# Patient Record
Sex: FEMALE | Race: WHITE | NOT HISPANIC OR LATINO | Employment: UNEMPLOYED | URBAN - NONMETROPOLITAN AREA
[De-identification: names, ages, dates, MRNs, and addresses within clinical notes are randomized per-mention and may not be internally consistent; named-entity substitution may affect disease eponyms.]

---

## 2017-01-16 ENCOUNTER — TRANSCRIBE ORDERS (OUTPATIENT)
Dept: ADMINISTRATIVE | Facility: HOSPITAL | Age: 52
End: 2017-01-16

## 2017-01-16 DIAGNOSIS — Z12.31 VISIT FOR SCREENING MAMMOGRAM: Primary | ICD-10-CM

## 2017-01-25 ENCOUNTER — HOSPITAL ENCOUNTER (OUTPATIENT)
Dept: GENERAL RADIOLOGY | Facility: HOSPITAL | Age: 52
Discharge: HOME | End: 2017-01-25
Admitting: PHYSICIAN ASSISTANT

## 2017-01-25 ENCOUNTER — TRANSCRIBE ORDERS (OUTPATIENT)
Dept: ADMINISTRATIVE | Facility: HOSPITAL | Age: 52
End: 2017-01-25

## 2017-01-25 DIAGNOSIS — Z12.31 VISIT FOR SCREENING MAMMOGRAM: Primary | ICD-10-CM

## 2017-01-25 DIAGNOSIS — M25.561 PAIN IN BOTH KNEES, UNSPECIFIED CHRONICITY: Primary | ICD-10-CM

## 2017-01-25 DIAGNOSIS — M25.562 PAIN IN BOTH KNEES, UNSPECIFIED CHRONICITY: Primary | ICD-10-CM

## 2017-01-25 DIAGNOSIS — M25.561 PAIN IN BOTH KNEES, UNSPECIFIED CHRONICITY: ICD-10-CM

## 2017-01-25 DIAGNOSIS — M25.562 PAIN IN BOTH KNEES, UNSPECIFIED CHRONICITY: ICD-10-CM

## 2017-01-25 PROCEDURE — 73562 X-RAY EXAM OF KNEE 3: CPT | Performed by: RADIOLOGY

## 2017-01-25 PROCEDURE — 73564 X-RAY EXAM KNEE 4 OR MORE: CPT

## 2017-02-11 ENCOUNTER — HOSPITAL ENCOUNTER (OUTPATIENT)
Dept: MAMMOGRAPHY | Facility: HOSPITAL | Age: 52
Discharge: HOME | End: 2017-02-11
Attending: FAMILY MEDICINE | Admitting: FAMILY MEDICINE

## 2017-02-11 ENCOUNTER — APPOINTMENT (OUTPATIENT)
Dept: MAMMOGRAPHY | Facility: HOSPITAL | Age: 52
End: 2017-02-11
Attending: FAMILY MEDICINE

## 2017-02-11 DIAGNOSIS — Z12.31 VISIT FOR SCREENING MAMMOGRAM: ICD-10-CM

## 2017-02-11 PROCEDURE — 77067 SCR MAMMO BI INCL CAD: CPT | Performed by: RADIOLOGY

## 2017-02-11 PROCEDURE — 77063 BREAST TOMOSYNTHESIS BI: CPT | Performed by: RADIOLOGY

## 2017-02-11 PROCEDURE — G0202 SCR MAMMO BI INCL CAD: HCPCS

## 2017-02-11 PROCEDURE — 77063 BREAST TOMOSYNTHESIS BI: CPT

## 2017-09-15 ENCOUNTER — TRANSCRIBE ORDERS (OUTPATIENT)
Dept: LAB | Facility: HOSPITAL | Age: 52
End: 2017-09-15

## 2017-09-15 ENCOUNTER — LAB (OUTPATIENT)
Dept: LAB | Facility: HOSPITAL | Age: 52
End: 2017-09-15

## 2017-09-15 DIAGNOSIS — E13.8 DIABETES MELLITUS OF OTHER TYPE WITH COMPLICATION: ICD-10-CM

## 2017-09-15 DIAGNOSIS — E13.8 DIABETES MELLITUS OF OTHER TYPE WITH COMPLICATION: Primary | ICD-10-CM

## 2017-09-15 LAB
ALBUMIN SERPL-MCNC: 4.3 G/DL (ref 3.2–4.8)
ALBUMIN/GLOB SERPL: 1.6 G/DL (ref 1.5–2.5)
ALP SERPL-CCNC: 80 U/L (ref 25–100)
ALT SERPL W P-5'-P-CCNC: 16 U/L (ref 7–40)
ANION GAP SERPL CALCULATED.3IONS-SCNC: 6 MMOL/L (ref 3–11)
ARTICHOKE IGE QN: 100 MG/DL (ref 0–130)
AST SERPL-CCNC: 21 U/L (ref 0–33)
BILIRUB SERPL-MCNC: 0.6 MG/DL (ref 0.3–1.2)
BUN BLD-MCNC: 22 MG/DL (ref 9–23)
BUN/CREAT SERPL: 31.4 (ref 7–25)
CALCIUM SPEC-SCNC: 9.1 MG/DL (ref 8.7–10.4)
CHLORIDE SERPL-SCNC: 108 MMOL/L (ref 99–109)
CHOLEST SERPL-MCNC: 180 MG/DL (ref 0–200)
CO2 SERPL-SCNC: 28 MMOL/L (ref 20–31)
CREAT BLD-MCNC: 0.7 MG/DL (ref 0.6–1.3)
DEPRECATED RDW RBC AUTO: 46 FL (ref 37–54)
ERYTHROCYTE [DISTWIDTH] IN BLOOD BY AUTOMATED COUNT: 13.7 % (ref 11.3–14.5)
GFR SERPL CREATININE-BSD FRML MDRD: 87 ML/MIN/1.73
GLOBULIN UR ELPH-MCNC: 2.7 GM/DL
GLUCOSE BLD-MCNC: 129 MG/DL (ref 70–100)
HBA1C MFR BLD: 6.2 % (ref 4.8–5.6)
HCT VFR BLD AUTO: 40.3 % (ref 34.5–44)
HDLC SERPL-MCNC: 54 MG/DL (ref 40–60)
HGB BLD-MCNC: 12.8 G/DL (ref 11.5–15.5)
MCH RBC QN AUTO: 29.2 PG (ref 27–31)
MCHC RBC AUTO-ENTMCNC: 31.8 G/DL (ref 32–36)
MCV RBC AUTO: 91.8 FL (ref 80–99)
PLATELET # BLD AUTO: 279 10*3/MM3 (ref 150–450)
PMV BLD AUTO: 8.9 FL (ref 6–12)
POTASSIUM BLD-SCNC: 4.8 MMOL/L (ref 3.5–5.5)
PROT SERPL-MCNC: 7 G/DL (ref 5.7–8.2)
RBC # BLD AUTO: 4.39 10*6/MM3 (ref 3.89–5.14)
SODIUM BLD-SCNC: 142 MMOL/L (ref 132–146)
TRIGL SERPL-MCNC: 104 MG/DL (ref 0–150)
WBC NRBC COR # BLD: 6.08 10*3/MM3 (ref 3.5–10.8)

## 2017-09-15 PROCEDURE — 80053 COMPREHEN METABOLIC PANEL: CPT

## 2017-09-15 PROCEDURE — 80061 LIPID PANEL: CPT

## 2017-09-15 PROCEDURE — 83036 HEMOGLOBIN GLYCOSYLATED A1C: CPT

## 2017-09-15 PROCEDURE — 85027 COMPLETE CBC AUTOMATED: CPT

## 2017-09-15 PROCEDURE — 36415 COLL VENOUS BLD VENIPUNCTURE: CPT

## 2017-09-15 PROCEDURE — 82043 UR ALBUMIN QUANTITATIVE: CPT

## 2017-09-15 PROCEDURE — 82570 ASSAY OF URINE CREATININE: CPT

## 2017-09-16 LAB
CREAT 24H UR-MCNC: 79.9 MG/DL
MICROALB/CRT. RATIO UR: 9.4 MG/G CREAT (ref 0–30)
MICROALBUMIN UR-MCNC: 7.5 UG/ML

## 2018-04-23 ENCOUNTER — TRANSCRIBE ORDERS (OUTPATIENT)
Dept: LAB | Facility: HOSPITAL | Age: 53
End: 2018-04-23

## 2018-04-23 ENCOUNTER — LAB (OUTPATIENT)
Dept: LAB | Facility: HOSPITAL | Age: 53
End: 2018-04-23

## 2018-04-23 DIAGNOSIS — I10 ESSENTIAL HYPERTENSION, MALIGNANT: ICD-10-CM

## 2018-04-23 DIAGNOSIS — E78.2 MIXED HYPERLIPIDEMIA: Primary | ICD-10-CM

## 2018-04-23 DIAGNOSIS — E10.9 TYPE 1 DIABETES MELLITUS WITHOUT COMPLICATION (HCC): ICD-10-CM

## 2018-04-23 DIAGNOSIS — E78.2 MIXED HYPERLIPIDEMIA: ICD-10-CM

## 2018-04-23 LAB
ALBUMIN SERPL-MCNC: 4.4 G/DL (ref 3.2–4.8)
ALBUMIN/GLOB SERPL: 1.8 G/DL (ref 1.5–2.5)
ALP SERPL-CCNC: 73 U/L (ref 25–100)
ALT SERPL W P-5'-P-CCNC: 25 U/L (ref 7–40)
ANION GAP SERPL CALCULATED.3IONS-SCNC: 7 MMOL/L (ref 3–11)
ARTICHOKE IGE QN: 110 MG/DL (ref 0–130)
AST SERPL-CCNC: 22 U/L (ref 0–33)
BILIRUB SERPL-MCNC: 0.9 MG/DL (ref 0.3–1.2)
BUN BLD-MCNC: 25 MG/DL (ref 9–23)
BUN/CREAT SERPL: 31.3 (ref 7–25)
CALCIUM SPEC-SCNC: 9.4 MG/DL (ref 8.7–10.4)
CHLORIDE SERPL-SCNC: 104 MMOL/L (ref 99–109)
CHOLEST SERPL-MCNC: 190 MG/DL (ref 0–200)
CO2 SERPL-SCNC: 28 MMOL/L (ref 20–31)
CREAT BLD-MCNC: 0.8 MG/DL (ref 0.6–1.3)
DEPRECATED RDW RBC AUTO: 44.7 FL (ref 37–54)
ERYTHROCYTE [DISTWIDTH] IN BLOOD BY AUTOMATED COUNT: 13.7 % (ref 11.3–14.5)
GFR SERPL CREATININE-BSD FRML MDRD: 75 ML/MIN/1.73
GLOBULIN UR ELPH-MCNC: 2.5 GM/DL
GLUCOSE BLD-MCNC: 145 MG/DL (ref 70–100)
HBA1C MFR BLD: 6.7 % (ref 4.8–5.6)
HCT VFR BLD AUTO: 41 % (ref 34.5–44)
HDLC SERPL-MCNC: 68 MG/DL (ref 40–60)
HGB BLD-MCNC: 13.3 G/DL (ref 11.5–15.5)
MCH RBC QN AUTO: 29 PG (ref 27–31)
MCHC RBC AUTO-ENTMCNC: 32.4 G/DL (ref 32–36)
MCV RBC AUTO: 89.5 FL (ref 80–99)
PLATELET # BLD AUTO: 303 10*3/MM3 (ref 150–450)
PMV BLD AUTO: 8.9 FL (ref 6–12)
POTASSIUM BLD-SCNC: 4.3 MMOL/L (ref 3.5–5.5)
PROT SERPL-MCNC: 6.9 G/DL (ref 5.7–8.2)
RBC # BLD AUTO: 4.58 10*6/MM3 (ref 3.89–5.14)
SODIUM BLD-SCNC: 139 MMOL/L (ref 132–146)
TRIGL SERPL-MCNC: 74 MG/DL (ref 0–150)
WBC NRBC COR # BLD: 7.39 10*3/MM3 (ref 3.5–10.8)

## 2018-04-23 PROCEDURE — 83036 HEMOGLOBIN GLYCOSYLATED A1C: CPT | Performed by: PHYSICIAN ASSISTANT

## 2018-04-23 PROCEDURE — 80061 LIPID PANEL: CPT

## 2018-04-23 PROCEDURE — 36415 COLL VENOUS BLD VENIPUNCTURE: CPT | Performed by: PHYSICIAN ASSISTANT

## 2018-04-23 PROCEDURE — 80053 COMPREHEN METABOLIC PANEL: CPT

## 2018-04-23 PROCEDURE — 85027 COMPLETE CBC AUTOMATED: CPT

## 2018-04-25 DIAGNOSIS — M25.561 PAIN IN BOTH KNEES, UNSPECIFIED CHRONICITY: Primary | ICD-10-CM

## 2018-04-25 DIAGNOSIS — M25.562 PAIN IN BOTH KNEES, UNSPECIFIED CHRONICITY: Primary | ICD-10-CM

## 2018-04-26 ENCOUNTER — OFFICE VISIT (OUTPATIENT)
Dept: ORTHOPEDIC SURGERY | Facility: CLINIC | Age: 53
End: 2018-04-26

## 2018-04-26 ENCOUNTER — HOSPITAL ENCOUNTER (OUTPATIENT)
Dept: GENERAL RADIOLOGY | Facility: HOSPITAL | Age: 53
Discharge: HOME | End: 2018-04-26
Attending: ORTHOPAEDIC SURGERY | Admitting: ORTHOPAEDIC SURGERY

## 2018-04-26 VITALS — HEIGHT: 72 IN | BODY MASS INDEX: 33.72 KG/M2 | WEIGHT: 249 LBS

## 2018-04-26 DIAGNOSIS — M25.562 PAIN IN BOTH KNEES, UNSPECIFIED CHRONICITY: ICD-10-CM

## 2018-04-26 DIAGNOSIS — M25.561 PAIN IN BOTH KNEES, UNSPECIFIED CHRONICITY: ICD-10-CM

## 2018-04-26 DIAGNOSIS — M22.2X1 PATELLOFEMORAL PAIN SYNDROME OF BOTH KNEES: Primary | ICD-10-CM

## 2018-04-26 DIAGNOSIS — M22.2X2 PATELLOFEMORAL PAIN SYNDROME OF BOTH KNEES: Primary | ICD-10-CM

## 2018-04-26 PROCEDURE — 99203 OFFICE O/P NEW LOW 30 MIN: CPT | Performed by: ORTHOPAEDIC SURGERY

## 2018-04-26 PROCEDURE — 73564 X-RAY EXAM KNEE 4 OR MORE: CPT

## 2018-04-26 PROCEDURE — 73562 X-RAY EXAM OF KNEE 3: CPT | Performed by: RADIOLOGY

## 2018-04-26 NOTE — PROGRESS NOTES
New Patient Visit        Patient: Zina Angel  YOB: 1965  Date of encounter: 4/26/2018      History of Present Illness:   Zina Angel is a 52 y.o. female who is referred here today by Jj Padilla PA-C for evaluation of bilateral knee pain.  She states she's had ongoing knee pain for several years with no known injury.  She states the right is a little worse than the left.  She complains of anterior knee pain with a popping and cracking sensation.  She states her pain is worse with weightbearing activities and especially worse with going up and down stairs or flexion of the knee.  She is currently taking Mobic with mild improvement in her symptoms.  She is also received intra-articular steroid injections would seem to ease the pain for about 2-1/2-3 months at a time.  She states her last injection was given about 2 weeks ago.  She denies swelling, catching, or locking sensation.    PMH:   There is no problem list on file for this patient.    History reviewed. No pertinent past medical history.    PSH:  Past Surgical History:   Procedure Laterality Date   • HYSTERECTOMY      partial 43       Allergies:     Allergies   Allergen Reactions   • Penicillins    • Sulfa Antibiotics        Medications:     Current Outpatient Prescriptions:   •  atorvastatin (LIPITOR) 10 MG tablet, Take 1 tablet by mouth daily., Disp: 30 tablet, Rfl: 2  •  atorvastatin (LIPITOR) 10 MG tablet, Take 1 tablet by mouth Daily., Disp: 30 tablet, Rfl: 2  •  atorvastatin (LIPITOR) 10 MG tablet, Take 1 tablet by mouth Daily., Disp: 90 tablet, Rfl: 1  •  azithromycin (ZITHROMAX Z-CEDRICK) 250 MG tablet, Take 2 tablets by mouth on the first day, then 1 tablet every day, Disp: 6 tablet, Rfl: 0  •  benzonatate (TESSALON) 200 MG capsule, Take 1 capsule by mouth Every 8 Hours As Needed., Disp: 30 capsule, Rfl: 0  •  benzonatate (TESSALON) 200 MG capsule, Take 1 capsule by mouth Every 8 (Eight) Hours As Needed., Disp: 30 capsule, Rfl:  0  •  cephalexin (KEFLEX) 500 MG capsule, Take 1 capsule by mouth 2 (Two) Times a Day., Disp: 60 capsule, Rfl: 2  •  clindamycin (CLEOCIN T) 1 % lotion, Apply to affected area 2 times a day, Disp: 60 mL, Rfl: 6  •  diclofenac (VOLTAREN) 1 % gel gel, Apply topically twice daily., Disp: 300 g, Rfl: 0  •  loratadine (CLARITIN) 10 MG tablet, Take 1 tablet by mouth Daily., Disp: 30 tablet, Rfl: 0  •  meclizine (ANTIVERT) 25 MG tablet, Take 1 tablet by mouth Every 8 (Eight) Hours As Needed., Disp: 30 tablet, Rfl: 0  •  meloxicam (MOBIC) 7.5 MG tablet, Take 1 tablet by mouth 2 (Two) Times a Day., Disp: 180 tablet, Rfl: 3  •  meloxicam (MOBIC) 7.5 MG tablet, Take 1 tablet by mouth 2 (Two) Times a Day., Disp: 180 tablet, Rfl: 1  •  metFORMIN (GLUCOPHAGE) 500 MG tablet, Take 1 tablet by mouth 2 (Two) Times a Day., Disp: 60 tablet, Rfl: 0  •  metFORMIN (GLUCOPHAGE) 500 MG tablet, Take 1 tablet by mouth 2 (Two) Times a Day., Disp: 60 tablet, Rfl: 1  •  metFORMIN (GLUCOPHAGE) 500 MG tablet, Take 1 tablet by mouth 2 (Two) Times a Day., Disp: 180 tablet, Rfl: 1  •  nabumetone (RELAFEN) 500 MG tablet, Take 1 tablet by mouth 2 (Two) Times a Day., Disp: 60 tablet, Rfl: 2  •  nabumetone (RELAFEN) 500 MG tablet, Take 1 tablet by mouth 2 (Two) Times a Day., Disp: 60 tablet, Rfl: 2  •  pantoprazole (PROTONIX) 40 MG EC tablet, Take 1 tablet by mouth Daily., Disp: 90 tablet, Rfl: 3  •  pantoprazole (PROTONIX) 40 MG EC tablet, Take 1 tablet by mouth Daily., Disp: 30 tablet, Rfl: 0  •  pantoprazole (PROTONIX) 40 MG EC tablet, Take 1 tablet by mouth Daily., Disp: 30 tablet, Rfl: 0  •  pantoprazole (PROTONIX) 40 MG EC tablet, Take 1 tablet by mouth Daily., Disp: 30 tablet, Rfl: 1  •  pantoprazole (PROTONIX) 40 MG EC tablet, Take 1 tablet by mouth Daily., Disp: 30 tablet, Rfl: 1  •  pantoprazole (PROTONIX) 40 MG EC tablet, Take 1 tablet by mouth Daily., Disp: 90 tablet, Rfl: 1  •  pantoprazole (PROTONIX) 40 MG EC tablet, Take 1 tablet by mouth  "Daily., Disp: 90 tablet, Rfl: 1  •  tiZANidine (ZANAFLEX) 4 MG tablet, Take 1 tablet by mouth daily., Disp: 90 tablet, Rfl: 3  •  tiZANidine (ZANAFLEX) 4 MG tablet, Take 1 tablet by mouth Daily., Disp: 90 tablet, Rfl: 3    Social History:  Social History     Social History   • Marital status: Single     Spouse name: N/A   • Number of children: N/A   • Years of education: N/A     Occupational History   • Not on file.     Social History Main Topics   • Smoking status: Not on file   • Smokeless tobacco: Not on file   • Alcohol use Not on file   • Drug use: Unknown   • Sexual activity: Not on file     Other Topics Concern   • Not on file     Social History Narrative   • No narrative on file       Family History:   History reviewed. No pertinent family history.    Review of Systems:   Review of Systems   Constitutional: Positive for fatigue.   HENT: Positive for sinus pain, sinus pressure and tinnitus.    Eyes: Positive for pain, discharge, redness and itching.   Respiratory: Negative.    Cardiovascular: Negative.    Gastrointestinal: Negative.    Endocrine: Negative.    Genitourinary: Negative.    Musculoskeletal: Positive for arthralgias, joint swelling and myalgias.   Skin: Negative.    Neurological: Negative.    Hematological: Negative.    Psychiatric/Behavioral: The patient is nervous/anxious.        Physical Exam: 52 y.o. female  General Appearance:    Alert and oriented x 3, cooperative, in no acute distress                   Vitals:    04/26/18 1438   Weight: 113 kg (249 lb)   Height: 185.4 cm (73\")              Body mass index is 32.85 kg/m².        Musculoskeletal: Examination of the bilateral knees reveals no effusion.  She has mild medial joint line tenderness.  She has moderate crepitus of the patella with full range of motion.  No instability with varus or valgus stressing.  Her neurovascular status is intact.    Radiology:     3 views of the bilateral knees were reviewed revealing narrowing of the " patellofemoral joint with lateral tilt.  There is minimal narrowing of the medial compartment.    Assessment    ICD-10-CM ICD-9-CM   1. Patellofemoral pain syndrome of both knees M22.2X1 719.46    M22.2X2        Plan:   A 52-year-old female with complete bilateral knee pain.  X-rays were reviewed revealing mild arthritis mainly within the patellofemoral joint.  She does have slight lateral tilt on x-rays as well.  We would like to get her started in formal physical therapy and an anterior knee program especially working on strengthening of the quadriceps muscle.  In addition of this we provided her with a lateral J brace for the right knee to see if this whole help reduce some of her pain.  We'll have her return back in 6 weeks for reevaluation.    Written by, Soni RINCON, acting as a scribe for Dr. Herzog              Patient's Body mass index is 32.85 kg/m². BMI is above normal parameters. Follow-up plan includes:  educational material.

## 2018-06-25 DIAGNOSIS — M22.2X2 PATELLOFEMORAL PAIN SYNDROME OF BOTH KNEES: Primary | ICD-10-CM

## 2018-06-25 DIAGNOSIS — M22.2X1 PATELLOFEMORAL PAIN SYNDROME OF BOTH KNEES: Primary | ICD-10-CM

## 2018-07-12 ENCOUNTER — HOSPITAL ENCOUNTER (OUTPATIENT)
Dept: PHYSICAL THERAPY | Facility: HOSPITAL | Age: 53
Setting detail: THERAPIES SERIES
Discharge: HOME | End: 2018-07-12

## 2018-07-12 DIAGNOSIS — M22.2X2 PATELLOFEMORAL PAIN SYNDROME OF BOTH KNEES: Primary | ICD-10-CM

## 2018-07-12 DIAGNOSIS — M22.2X1 PATELLOFEMORAL PAIN SYNDROME OF BOTH KNEES: Primary | ICD-10-CM

## 2018-07-12 PROCEDURE — 97163 PT EVAL HIGH COMPLEX 45 MIN: CPT | Performed by: PHYSICAL THERAPIST

## 2018-07-12 PROCEDURE — G0283 ELEC STIM OTHER THAN WOUND: HCPCS | Performed by: PHYSICAL THERAPIST

## 2018-07-12 PROCEDURE — 97010 HOT OR COLD PACKS THERAPY: CPT | Performed by: PHYSICAL THERAPIST

## 2018-07-12 NOTE — THERAPY EVALUATION
"    Outpatient Physical Therapy Ortho Initial Evaluation   [facility]     Patient Name: Zina Angel  : 1965  MRN: [id number]  Today's Date: 2018      Visit Date: 2018    There is no problem list on file for this patient.       Past Medical History:   Diagnosis Date   • Arthritis    • Diabetes mellitus (CMS/[facility]ly [facility])         Past Surgical History:   Procedure Laterality Date   • HYSTERECTOMY      partial 43       Visit Dx:     ICD-10-CM ICD-9-CM   1. Patellofemoral pain syndrome of both knees M22.2X1 719.46    M22.2X2              Patient History     Row Name 18 1500             History    Chief Complaint Pain  -AD      Type of Pain Knee pain  -AD      Date Current Problem(s) Began --   Ongoing  -AD      Brief Description of Current Complaint Pt reports a slow progression of bilateral knee pain. She stated both knees stay swollen, have \"knots\", and \"cringe when I walk\". She stated she is unable to ascend/descend stairs or squat secondary to pain. She stated she has had injections for approximately one year and has recently been referred to an orthopedic. She stated prolonged standing and walking increase pain, which is a part of her job description. She staetd her knees tend to \"lock up and give out\" with occasional \"catching\". She stated the catch will last several hours at a time. She stated she has been provided a right knee brace to keep her \"knee cap in place\".  -AD      Patient/Caregiver Goals Relieve pain;Return to prior level of function;Improve mobility;Improve strength  -AD      Current Tobacco Use None  -AD      Patient's Rating of General Health Very good  -AD      Occupation/sports/leisure activities   -AD      What clinical tests have you had for this problem? X-ray  -AD      Results of Clinical Tests Negative for fractures.  -AD      Are you or can you be pregnant No  -AD         Pain     Pain Location Knee   Bilateral  -AD      Pain at Present 4  -AD      Pain " "at Best 4  -AD      Pain at Worst 8  -AD      Pain Frequency Constant/continuous  -AD      Pain Description Aching  -AD      What Performance Factors Make the Current Problem(s) WORSE? Prolonged standing, walking, squatting, stairs  -AD      What Performance Factors Make the Current Problem(s) BETTER? Rest, ice, biofreeze  -AD      Tolerance Time- Standing 2 hours  -AD      Tolerance Time- Sitting 2 hours  -AD      Tolerance Time- Walking Intermittent for a couple hours  -AD      Is your sleep disturbed? No  -AD      What position do you sleep in? Supine  -AD      Difficulties at work? Pain with prolonged standing and walking.  -AD      Difficulties with ADL's? None  -AD         Fall Risk Assessment    Any falls in the past year: No  -AD         Daily Activities    Primary Language English  -AD      Are you able to read Yes  -AD      Are you able to write Yes  -AD      How does patient learn best? Listening  -AD      Pt Participated in POC and Goals Yes  -AD         Safety    Are you being hurt, hit, or frightened by anyone at home or in your life? No  -AD      Are you being neglected by a caregiver No  -AD        User Key  (r) = Recorded By, (t) = Taken By, (c) = Cosigned By    Initials Name Provider Type    AD Ashley Claudene Dalton, PT Physical Therapist                PT Ortho     Row Name 07/12/18 1500       Posture/Observations    Posture- WNL Posture is WNL  -AD       Special Tests/Palpation    Special Tests/Palpation --   Right medial joint line, bilateral patella, deep\"whole knee\"  -AD       Knee Special Tests    Anterior drawer (ACL lesion) Bilateral:;Negative  -AD    Posterior drawer (PCL lesion) Bilateral:;Negative  -AD    Valgus stress (MCL lesion) Bilateral:;Negative  -AD    Varus stress (LCL lesion) Bilateral:;Negative  -AD    Ian’s test (meniscal lesion) Bilateral:;Negative  -AD    Zackery’s sign (DVT) Bilateral:;Negative  -AD       Right Lower Ext    Rt Knee Extension/Flexion AROM 0-130  -AD    "    Left Lower Ext    Lt Knee Extension/Flexion AROM 0-130  -AD       MMT (Manual Muscle Testing)    Additional Documentation --   RLE: 4/5, LLE: 4+/5  -AD       Sensation    Sensation WNL? WNL  -AD       Flexibility    Flexibility Tested? --   Hamstring: Rt 30 deg from neutral, left 35 deg from neutral  -AD       RLE Quick Girth (cm)    Mid patella 49 cm  -AD       LLE Quick Girth (cm)    Mid patella 47 cm  -AD       Transfers    Bed-[place] (Transfers) independent  -AD    Chair-[place] (Transfers) independent  -AD    Sit-Stand [place] (Transfers) independent  -AD    Stand-Sit Hill (Transfers) independent  -AD       Gait/Stairs Assessment/Training    Comment (Gait/Stairs) Decreased stance phase, decreased step length, decreased weight shifting  -AD      User Key  (r) = Recorded By, (t) = Taken By, (c) = Cosigned By    Initials Name Provider Type    AD Ashley Claudene Dalton, PT Physical Therapist                      Therapy Education  Given: HEP, Symptoms/condition management, Pain management, Posture/body mechanics, Mobility training  Program: New  How Provided: Verbal, Demonstration  Provided to: Patient, Caregiver  Level of Understanding: Teach back education performed, Verbalized, Demonstrated           PT OP Goals     Row Name 07/12/18 1600          PT Short Term Goals    STG Date to Achieve 07/26/18  -AD     STG 1 Pt will be instructed in HEP for improved independence.  -AD     STG 1 Progress New  -AD     STG 2 Pt will report the ability to ascend/descend five stairs with no more than 6/10 bilateral knee pain for improved function.  -AD     STG 2 Progress New  -AD     STG 3 Pt will report the ability to lift a light object from the floor with no more than 5/10 bilateral knee pain.  -AD     STG 3 Progress New  -AD        Long Term Goals    LTG Date to Achieve 08/11/18  -AD     LTG 1 Pt will report the ability to get into and out of her car with bilateral knee pain.  -AD     LTG  "1 Progress New  -AD     LTG 2 Pt will demonstrate 5/5 bilateral lower extremity strength for improved function.  -AD     LTG 2 Progress New  -AD     LTG 3 Pt will report at least 60/80 on the LEFS for improved functional mobility.  -AD     LTG 3 Progress New  -AD     LTG 4 Pt will report the ability to ascend/descend one flight of stairs with no more than 4/10 bilateral knee pain for improved community mobility.  -AD     LTG 4 Progress New  -AD     LTG 5 Pt will report the ability to work a full shift with no more than 4/10 bilateral knee pain.  -AD     LTG 5 Progress New  -AD        Time Calculation    PT Goal Re-Cert Due Date 08/11/18  -AD       User Key  (r) = Recorded By, (t) = Taken By, (c) = Cosigned By    Initials Name Provider Type    AD Ashley Claudene Dalton, PT Physical Therapist                PT Assessment/Plan     Row Name 07/12/18 1627          PT Assessment    Functional Limitations Decreased safety during functional activities;Impaired gait;Limitations in functional capacity and performance;Performance in leisure activities;Performance in self-care ADL;Performance in work activities;Limitations in community activities;Limitation in home management  -AD     Impairments Balance;Cognition;Gait;Range of motion;Muscle strength;Poor body mechanics;Posture;Joint mobility;Joint integrity;Pain  -AD     Assessment Comments The patient is a 53 year old female presenting to the clinic with bilateral knee pain. She presented with tenderness of bilateral patellas, right medial joint line pain, and a \"deep ache throughout the whole knee\", bilaterally. Special tests for ligamentous involvement were negative, with quadriceps weakness observed and valgus movement observed during mini squats. The patient would benefit from skilled physical therapy to address functional limitations and impairments which are affecting daily life at home, community, and work. Today's evaluation was concluded with moist heat combined with " premod to bialteral knees, with no skin irritation observed. She will be progressed as tolerated.  -AD     Please refer to paper survey for additional self-reported information Yes  -AD     Rehab Potential Good  -AD     Patient/caregiver participated in establishment of treatment plan and goals Yes  -AD     Patient would benefit from skilled therapy intervention Yes  -AD        PT Plan    PT Frequency 2x/week;1x/week  -AD     Predicted Duration of Therapy Intervention (Therapy Eval) 4 weeks  -AD     Planned CPT's? PT EVAL HIGH COMPLEXITY: 50626;PT RE-EVAL: 06375;PT NEUROMUSC RE-EDUCATION EA 15 MIN: 15284;PT MANUAL THERAPY EA 15 MIN: 97070;PT THER ACT EA 15 MIN: 50894;PT THER PROC EA 15 MIN: 35628;PT GAIT TRAINING EA 15 MIN: 33627;PT SELF CARE/HOME MGMT/TRAIN EA 15: 96535;PT ULTRASOUND EA 15 MIN: 54229;PT ELECTRICAL STIM ATTD EA 15 MIN: 47702;PT ELECTRICAL STIM UNATTEND: ;PT HOT/COLD PACK WC NONMCARE: 42930;PT IONTOPHORESIS EA 15 MIN: 78749;PT THER SUPP EA 15 MIN  -AD     PT Plan Comments Progress as tolerated per POC.  -AD       User Key  (r) = Recorded By, (t) = Taken By, (c) = Cosigned By    Initials Name Provider Type    AD Ashley Claudene Dalton, PT Physical Therapist                Modalities     Row Name 07/12/18 1500             Moist Heat    MH Applied Yes  -AD      Location Bilateral knees  -AD      Rx Minutes 15 mins  -AD      MH S/P Rx Yes   With premod, no skin irritation observed.  -AD         ELECTRICAL STIMULATION    Attended/Unattended Unattended  -AD      Stimulation Type Pre-Mod  -AD      Max mAmp --   Per pt tolerance  -AD      80359 - PT Electrical Stimulation (Manual) Minutes --   Unattended with MH for 15 minutes  -AD        User Key  (r) = Recorded By, (t) = Taken By, (c) = Cosigned By    Initials Name Provider Type    AD Ashley Claudene Dalton, PT Physical Therapist                          Outcome Measure Options: Lower Extremity Functional Scale (LEFS)  Lower Extremity Functional  Index  Any of your usual work, housework or school activities: A little bit of difficulty  Your usual hobbies, recreational or sporting activities: A little bit of difficulty  Getting into or out of the bath: A little bit of difficulty  Walking between rooms: A little bit of difficulty  Putting on your shoes or socks: No difficulty  Squatting: Extreme difficulty or unable to perform activity  Lifting an object, like a bag of groceries from the floor: A little bit of difficulty  Performing light activities around your home: A little bit of difficulty  Performing heavy activities around your home: A little bit of difficulty  Getting into or out of a car: Moderate difficulty  Walking 2 blocks: Moderate difficulty  Walking a mile: Moderate difficulty  Going up or down 10 stairs (about 1 flight of stairs): Extreme difficulty or unable to perform activity  Standing for 1 hour: Moderate difficulty  Sitting for 1 hour: Moderate difficulty  Running on even ground: Moderate difficulty  Running on uneven ground: Moderate difficulty  Making sharp turns while running fast: A little bit of difficulty  Hopping: A little bit of difficulty  Rolling over in bed: No difficulty  Total: 49      Time Calculation:     Therapy Suggested Charges     Code   Minutes Charges    None             Start Time: 1500  Stop Time: 1620  Time Calculation (min): 80 min     Therapy Charges for Today     Code Description Service Date Service Provider Modifiers Qty    98495699717 HC PT EVAL HIGH COMPLEXITY 4 7/12/2018 Ashley Claudene Dalton, PT GP 1    24236118494 HC PT ELECTRICAL STIM UNATTENDED 7/12/2018 Ashley Claudene Dalton, PT  1    60851759636 HC PT HOT/COLD PACK WC NONMCARE 7/12/2018 Ashley Claudene Dalton, PT GP 1          PT G-Codes  Outcome Measure Options: Lower Extremity Functional Scale (LEFS)         Ashley Claudene Dalton, PT  7/12/2018

## 2018-07-19 ENCOUNTER — HOSPITAL ENCOUNTER (OUTPATIENT)
Dept: PHYSICAL THERAPY | Facility: HOSPITAL | Age: 53
Setting detail: THERAPIES SERIES
Discharge: HOME | End: 2018-07-19

## 2018-07-19 DIAGNOSIS — M22.2X1 PATELLOFEMORAL PAIN SYNDROME OF BOTH KNEES: Primary | ICD-10-CM

## 2018-07-19 DIAGNOSIS — M22.2X2 PATELLOFEMORAL PAIN SYNDROME OF BOTH KNEES: Primary | ICD-10-CM

## 2018-07-19 PROCEDURE — 97010 HOT OR COLD PACKS THERAPY: CPT | Performed by: PHYSICAL THERAPIST

## 2018-07-19 PROCEDURE — 97110 THERAPEUTIC EXERCISES: CPT | Performed by: PHYSICAL THERAPIST

## 2018-07-19 PROCEDURE — G0283 ELEC STIM OTHER THAN WOUND: HCPCS | Performed by: PHYSICAL THERAPIST

## 2018-07-19 PROCEDURE — 97035 APP MDLTY 1+ULTRASOUND EA 15: CPT | Performed by: PHYSICAL THERAPIST

## 2018-07-19 NOTE — THERAPY TREATMENT NOTE
Outpatient Physical Therapy Ortho Treatment Note  [facility]     Patient Name: Zina Angel  : 1965  MRN: [id number]  Today's Date: 2018      Visit Date: 2018    Visit Dx:    ICD-10-CM ICD-9-CM   1. Patellofemoral pain syndrome of both knees M22.2X1 719.46    M22.2X2        There is no problem list on file for this patient.       Past Medical History:   Diagnosis Date   • Arthritis    • Diabetes mellitus (CMS/HCC)         Past Surgical History:   Procedure Laterality Date   • HYSTERECTOMY      partial 43             PT Ortho     Row Name 18 1600       Subjective Comments    Subjective Comments Patient reported bilateral knee pain prior to today's treatment session. She stated she has more pain and difficulty with exercises on the left as compared to the right.  -AD       Subjective Pain    Able to rate subjective pain? yes  -AD    Pre-Treatment Pain Level 6  -AD    Post-Treatment Pain Level 4  -AD      User Key  (r) = Recorded By, (t) = Taken By, (c) = Cosigned By    Initials Name Provider Type    AD Ashley Claudene Dalton, PT Physical Therapist                            PT Assessment/Plan     Row Name 18 1716          PT Assessment    Assessment Comments Today's session was initiated with moist heat combined with premod e-stim to bilateral knees in the supine position. Therapeutic exercises were then performed to address bilateral lower extremity strength and range of motion. The patient demonstrated increased difficulty while performing exercises with the left lower extremity as compared to the left. The patient tolerated therapeutic exercises well, with fatigue but no increase in pain reported. The session concluded with therapeutic ultrasound with biofreeze to bilateral knees. No skin irritation was observed following modalities. She will be progressed as tolerated.  -AD        PT Plan    PT Plan Comments Progress as tolerated per POC. Patient requested to attend physical  therapy one time per week secondary to co-pay.  -AD       User Key  (r) = Recorded By, (t) = Taken By, (c) = Cosigned By    Initials Name Provider Type    AD Ashley Claudene Dalton, PT Physical Therapist                Modalities     Row Name 07/19/18 1600             Moist Heat    MH Applied Yes  -AD      Location Bilateral knees  -AD      Rx Minutes 10 mins  -AD      MH Prior to Rx Yes   With premod, no skin irritation observed.  -AD         Ultrasound 82181    Location Bilateral knees   With biofreeze  -AD      Duty Cycle 50  -AD      Frequency --   3.3 MHz  -AD      Intensity - Wts/cm 1.2  -AD      22160 - PT Ultrasound Minutes 8   No skin irritation observed.  -AD         ELECTRICAL STIMULATION    Attended/Unattended Unattended  -AD      Stimulation Type Pre-Mod  -AD      Max mAmp --   Per pt tolerance  -AD      Location/Electrode Placement/Other Bilateral knees  -AD      38845 - PT Electrical Stimulation (Manual) Minutes --   Unattended 10 minutes with moist heat.  -AD        User Key  (r) = Recorded By, (t) = Taken By, (c) = Cosigned By    Initials Name Provider Type    AD Ashley Claudene Dalton, PT Physical Therapist                Exercises     Row Name 07/19/18 1600             Subjective Comments    Subjective Comments Patient reported bilateral knee pain prior to today's treatment session. She stated she has more pain and difficulty with exercises on the left as compared to the right.  -AD         Subjective Pain    Able to rate subjective pain? yes  -AD      Pre-Treatment Pain Level 6  -AD      Post-Treatment Pain Level 4  -AD         Total Minutes    94828 - PT Therapeutic Exercise Minutes 35  -AD         Exercise 1    Exercise Name 1 Heel slides, QS, GS, SLR, SAQ, sidelying hip abduction, LE bicycle level 1 for 7 minutes, knee flexion stretch with towel.  -AD      Cueing 1 Verbal;Tactile;Demo  -AD      Sets 1 2  -AD      Reps 1 15  -AD      Time 1 35 minutes  -AD        User Key  (r) = Recorded By,  (t) = Taken By, (c) = Cosigned By    Initials Name Provider Type    AD Ashley Claudene Dalton, PT Physical Therapist                             Therapy Education  Given: HEP, Symptoms/condition management, Pain management, Posture/body mechanics, Mobility training  Program: Reinforced  How Provided: Verbal, Demonstration  Provided to: Patient, Caregiver  Level of Understanding: Teach back education performed, Verbalized, Demonstrated              Time Calculation:   Start Time: 1600  Stop Time: 1715  Time Calculation (min): 75 min  Therapy Suggested Charges     Code   Minutes Charges    71590 (CPT®) Hc Pt Neuromusc Re Education Ea 15 Min      53100 (CPT®) Hc Pt Ther Proc Ea 15 Min 35 2    97162 (CPT®) Hc Gait Training Ea 15 Min      74517 (CPT®) Hc Pt Therapeutic Act Ea 15 Min      62037 (CPT®) Hc Pt Manual Therapy Ea 15 Min      52116 (CPT®) Hc Pt Ther Massage- Per 15 Min      97717 (CPT®) Hc Pt Iontophoresis Ea 15 Min      30898 (CPT®) Hc Pt Elec Stim Ea-Per 15 Min      64834 (CPT®) Hc Pt Ultrasound Ea 15 Min 8 1    20738 (CPT®) Hc Pt Self Care/Mgmt/Train Ea 15 Min      Total  43 3        Therapy Charges for Today     Code Description Service Date Service Provider Modifiers Qty    97320875033 HC PT THER PROC EA 15 MIN 7/19/2018 Ashley Claudene Dalton, PT GP 2    82242066089 HC PT ULTRASOUND EA 15 MIN 7/19/2018 Ashley Claudene Dalton, PT GP 1    03434469482 HC PT ELECTRICAL STIM UNATTENDED 7/19/2018 Ashley Claudene Dalton, PT  1    71627853986 HC PT HOT/COLD PACK WC NONMCARE 7/19/2018 Ashley Claudene Dalton, PT GP 1                    Ashley Claudene Dalton, PT  7/19/2018

## 2018-07-26 ENCOUNTER — APPOINTMENT (OUTPATIENT)
Dept: PHYSICAL THERAPY | Facility: HOSPITAL | Age: 53
End: 2018-07-26

## 2018-08-02 ENCOUNTER — HOSPITAL ENCOUNTER (OUTPATIENT)
Dept: PHYSICAL THERAPY | Facility: HOSPITAL | Age: 53
Setting detail: THERAPIES SERIES
Discharge: HOME | End: 2018-08-02

## 2018-08-02 DIAGNOSIS — M22.2X2 PATELLOFEMORAL PAIN SYNDROME OF BOTH KNEES: Primary | ICD-10-CM

## 2018-08-02 DIAGNOSIS — M22.2X1 PATELLOFEMORAL PAIN SYNDROME OF BOTH KNEES: Primary | ICD-10-CM

## 2018-08-02 PROCEDURE — G0283 ELEC STIM OTHER THAN WOUND: HCPCS | Performed by: PHYSICAL THERAPIST

## 2018-08-02 PROCEDURE — 97010 HOT OR COLD PACKS THERAPY: CPT | Performed by: PHYSICAL THERAPIST

## 2018-08-02 PROCEDURE — 97033 APP MDLTY 1+IONTPHRSIS EA 15: CPT | Performed by: PHYSICAL THERAPIST

## 2018-08-02 PROCEDURE — 97110 THERAPEUTIC EXERCISES: CPT | Performed by: PHYSICAL THERAPIST

## 2018-08-02 NOTE — THERAPY TREATMENT NOTE
"    Outpatient Physical Therapy Ortho Treatment Note  [facility]     Patient Name: Zina Angel  : 1965  MRN: [id number]  Today's Date: 2018      Visit Date: 2018    Visit Dx:    ICD-10-CM ICD-9-CM   1. Patellofemoral pain syndrome of both knees M22.2X1 719.46    M22.2X2        There is no problem list on file for this patient.       Past Medical History:   Diagnosis Date   • Arthritis    • Diabetes mellitus (CMS/HCC)         Past Surgical History:   Procedure Laterality Date   • HYSTERECTOMY      partial 43             PT Ortho     Row Name 18 1600       Subjective Comments    Subjective Comments Pt reports a \"constant, nagging pain\" of bilateral knees.  -AD       Subjective Pain    Able to rate subjective pain? yes  -AD    Pre-Treatment Pain Level 7  -AD    Post-Treatment Pain Level 5  -AD      User Key  (r) = Recorded By, (t) = Taken By, (c) = Cosigned By    Initials Name Provider Type    AD Dalton, Ashley Claudene, PT Physical Therapist                            PT Assessment/Plan     Row Name 18 9757          PT Assessment    Assessment Comments The patient tolerated today's session well, which included moist heat combined with premod to bilateral lower extremities. The patient was educated in an advanced HEP, with patient discussing possible discharge secondary to scheduling conflicts. She stated she would contact her MD for a follow-up to discuss obtaining a TENS unit and patches for iontophoresis. She reported she would contact PT clinic with MD recommendation. The patient tolerated today's therapy session well, with patient demonstrating and verbalizing understanding of exercise program. Iontophoresis with dexamethasone was applied to the medial aspect of bilateral knees, with patient educated on proper wear. She was instructed to remove patches if skin irritation is observed. The patient will be progressed as tolerated.  -AD        PT Plan    PT Plan Comments Progress as " "tolerated per POC and MD order.  -AD       User Key  (r) = Recorded By, (t) = Taken By, (c) = Cosigned By    Initials Name Provider Type    AD Dalton, Ashley Claudene, APPLE Physical Therapist                Modalities     Row Name 08/02/18 1600             Moist Heat    MH Applied Yes  -AD      Location Bilateral knees  -AD      Rx Minutes 10 mins  -AD      MH Prior to Rx Yes   With premod, no skin irritation observed.  -AD         ELECTRICAL STIMULATION    Attended/Unattended Unattended  -AD      Stimulation Type Pre-Mod  -AD      Max mAmp --   Per pt tolerance  -AD      Location/Electrode Placement/Other Bilateral knees  -AD      18852 - PT Electrical Stimulation (Manual) Minutes --   Unattended with moist heat for 10 minutes.  -AD         Iontophoresis 07714    Dexamethasone used Yes   Pt educated in proper wear  -AD      Patch Type Medium  -AD      95618 - PT Iontophoresis Minutes 10  -AD        User Key  (r) = Recorded By, (t) = Taken By, (c) = Cosigned By    Initials Name Provider Type    AD Dalton, Ashley Claudene, PT Physical Therapist                Exercises     Row Name 08/02/18 1600             Subjective Comments    Subjective Comments Pt reports a \"constant, nagging pain\" of bilateral knees.  -AD         Subjective Pain    Able to rate subjective pain? yes  -AD      Pre-Treatment Pain Level 7  -AD      Post-Treatment Pain Level 5  -AD         Total Minutes    93663 - PT Therapeutic Exercise Minutes 30  -AD         Exercise 1    Exercise Name 1 Heel slides, QS, GS, SLR, SAQ, sidelying hip abduction, LAQ, gastroc/soleus stretch, RTB knee flexion, RTB hip abduction  -AD      Cueing 1 Verbal;Tactile;Demo  -AD      Sets 1 2  -AD      Reps 1 15  -AD      Time 1 30 minutes  -AD        User Key  (r) = Recorded By, (t) = Taken By, (c) = Cosigned By    Initials Name Provider Type    AD Dalton, Ashley Claudene, PT Physical Therapist                             Therapy Education  Given: HEP, Symptoms/condition " management, Pain management, Posture/body mechanics, Mobility training  Program: Reinforced  How Provided: Verbal, Demonstration  Provided to: Patient, Caregiver  Level of Understanding: Teach back education performed, Verbalized, Demonstrated              Time Calculation:   Start Time: 1620  Stop Time: 1730  Time Calculation (min): 70 min  Therapy Suggested Charges     Code   Minutes Charges    05000 (CPT®) Hc Pt Neuromusc Re Education Ea 15 Min      47047 (CPT®) Hc Pt Ther Proc Ea 15 Min 30 2    22107 (CPT®) Hc Gait Training Ea 15 Min      15309 (CPT®) Hc Pt Therapeutic Act Ea 15 Min      96922 (CPT®) Hc Pt Manual Therapy Ea 15 Min      66683 (CPT®) Hc Pt Ther Massage- Per 15 Min      30749 (CPT®) Hc Pt Iontophoresis Ea 15 Min 10 1    74090 (CPT®) Hc Pt Elec Stim Ea-Per 15 Min      51631 (CPT®) Hc Pt Ultrasound Ea 15 Min      72358 (CPT®) Hc Pt Self Care/Mgmt/Train Ea 15 Min      Total  40 3        Therapy Charges for Today     Code Description Service Date Service Provider Modifiers Qty    61318323549 HC PT THER PROC EA 15 MIN 8/2/2018 Dalton, Ashley Claudene, PT GP 2    26885353430 HC PT IONTOPHORESIS EA 15 MIN 8/2/2018 Dalton, Ashley Claudene, PT GP 1    23897488977 HC PT ELECTRICAL STIM UNATTENDED 8/2/2018 Dalton, Ashley Claudene, PT  1    06839250564 HC PT HOT/COLD PACK WC NONMCARE 8/2/2018 Dalton, Ashley Claudene, PT GP 1                    Ashley Claudene Dalton, PT  8/2/2018

## 2018-08-08 ENCOUNTER — TELEPHONE (OUTPATIENT)
Dept: ORTHOPEDIC SURGERY | Facility: CLINIC | Age: 53
End: 2018-08-08

## 2018-08-08 NOTE — TELEPHONE ENCOUNTER
Patient called requesting cortisone injections, wants to know if Dr Herzog would be okay with her family physician doing the injections.  Advised patient I would speak to Dr Herzog, will call her back.

## 2018-08-09 NOTE — TELEPHONE ENCOUNTER
Spoke to Dr. Herzog, he will see patient and discuss injections.  She will come in on8-28 for appointment.    patient verbalized understanding.

## 2018-08-27 ENCOUNTER — DOCUMENTATION (OUTPATIENT)
Dept: PHYSICAL THERAPY | Facility: HOSPITAL | Age: 53
End: 2018-08-27

## 2018-08-27 DIAGNOSIS — M22.2X2 PATELLOFEMORAL PAIN SYNDROME OF BOTH KNEES: Primary | ICD-10-CM

## 2018-08-27 DIAGNOSIS — M22.2X1 PATELLOFEMORAL PAIN SYNDROME OF BOTH KNEES: Primary | ICD-10-CM

## 2018-08-27 NOTE — THERAPY DISCHARGE NOTE
Outpatient Physical Therapy Discharge Summary         Patient Name: Zina Angel  : 1965  MRN: [id number]    Today's Date: 2018    Visit Dx:    ICD-10-CM ICD-9-CM   1. Patellofemoral pain syndrome of both knees M22.2X1 719.46    M22.2X2              PT OP Goals     Row Name 18 1500          PT Short Term Goals    STG 1 Pt will be instructed in HEP for improved independence.  -AD     STG 1 Progress Comments Unable to assess  -AD     STG 2 Pt will report the ability to ascend/descend five stairs with no more than 6/10 bilateral knee pain for improved function.  -AD     STG 2 Progress Comments Unable to assess  -AD     STG 3 Pt will report the ability to lift a light object from the floor with no more than 5/10 bilateral knee pain.  -AD     STG 3 Progress Comments Unable to assess  -AD        Long Term Goals    LTG 1 Pt will report the ability to get into and out of her car with bilateral knee pain.  -AD     LTG 1 Progress Comments Unable to assess  -AD     LTG 2 Pt will demonstrate 5/5 bilateral lower extremity strength for improved function.  -AD     LTG 2 Progress Comments Unable to assess  -AD     LTG 3 Pt will report at least 60/80 on the LEFS for improved functional mobility.  -AD     LTG 3 Progress Comments Unable to assess  -AD     LTG 4 Pt will report the ability to ascend/descend one flight of stairs with no more than 4/10 bilateral knee pain for improved community mobility.  -AD     LTG 4 Progress Comments Unable to assess  -AD     LTG 5 Pt will report the ability to work a full shift with no more than 4/10 bilateral knee pain.  -AD     LTG 5 Progress Comments Unable to assess  -AD       User Key  (r) = Recorded By, (t) = Taken By, (c) = Cosigned By    Initials Name Provider Type    AD Dalton, Ashley Claudene, PT Physical Therapist          OP PT Discharge Summary  Date of Discharge: 18  Reason for Discharge:  (Patient last seen on 18.)  Outcomes Achieved: Refer to plan  of care for updates on goals achieved  Discharge Destination:  (HEP provided at initial evaluation.)  Discharge Instructions/Additional Comments: The patient was evaluated on 7/12/18 and attended two follow-up sessions on 7/19/18 and 8/2/18. She stated she wished to try a week without physical therapy to see if any changes were observed. The patient stated she would contact the PT clinic to discuss POC. On 8/14/18 a voicemail was left with the patient in an effort to discuss future therapy. Due to lack of contact, the patient is being discharged at this time.      Time Calculation:        Therapy Suggested Charges     Code   Minutes Charges    None                       Ashley Claudene Dalton, PT  8/27/2018

## 2018-08-28 ENCOUNTER — OFFICE VISIT (OUTPATIENT)
Dept: ORTHOPEDIC SURGERY | Facility: CLINIC | Age: 53
End: 2018-08-28

## 2018-08-28 VITALS — WEIGHT: 255 LBS | HEIGHT: 72 IN | BODY MASS INDEX: 34.54 KG/M2

## 2018-08-28 DIAGNOSIS — M22.2X2 PATELLOFEMORAL PAIN SYNDROME OF BOTH KNEES: Primary | ICD-10-CM

## 2018-08-28 DIAGNOSIS — M22.2X1 PATELLOFEMORAL PAIN SYNDROME OF BOTH KNEES: Primary | ICD-10-CM

## 2018-08-28 PROCEDURE — 99213 OFFICE O/P EST LOW 20 MIN: CPT | Performed by: ORTHOPAEDIC SURGERY

## 2018-08-28 NOTE — PROGRESS NOTES
"Patient: Zina Angel    YOB: 1965    Chief Complaint   Patient presents with   • Left Knee - Pain, Follow-up   • Right Knee - Pain, Follow-up         History of Present Illness: Patient presents for follow-up of her knees bilaterally.  Still continues to complain of anterior knee pain associated she is on her knees with prolonged periods time.  Has gone through the physical therapy and is trying some exercises on her own but is not noticing a traumatic change.  Has been taking the Mobic and Voltaren gel.  Denies any swelling or paresthesias of the lower extremities    Past Medical History:   Diagnosis Date   • Arthritis    • Diabetes mellitus (CMS/[facility])    • Osteoarthritis         Social History     Social History   • Marital status: Single     Spouse name: N/A   • Number of children: N/A   • Years of education: N/A     Occupational History   • Not on file.     Social History Main Topics   • Smoking status: Never Smoker   • Smokeless tobacco: Never Used   • Alcohol use No   • Drug use: Unknown   • Sexual activity: Defer     Other Topics Concern   • Not on file     Social History Narrative   • No narrative on file           Physical Exam: 53 y.o. female  General Appearance:    Alert and oriented x 3, cooperative, in no acute distress                   Vitals:    08/28/18 1507   Weight: 116 kg (255 lb)   Height: 185.4 cm (73\")          Exam of the knees is pretty much unchanged.  She has good range of motion of her knee she does have patellofemoral crepitus noted.  No specific strength deficits are noted      Radiology:             Assessment/Plan: I discussed the situation at length with the patient.  I discussed her options including possible Visco supplementation although her insurance will not cover that.  Notable on her plain x-rays is that she has fair amount of lateral tilt and subluxation her kneecaps.  And we discussed the possible option of doing a lateral retinacular release " arthroscopically.  By looking at her complaints and her x-rays I think there is is this is a reasonable approach.  Would begin to discussed the risk including infection and bleeding time off work but the biggest risk is admitted not relieve all her symptoms.  She wants to think about it at this time I told her it is not in emergency.          Discussion/Summary:                This chart was completed utilizing the dragon speech recognition software.  Grammatical errors, random word insertions, pronoun errors, and incomplete sentences or occasional consequences of the system due to software limitations, ambient noise, and hardware issues.  Any questions or concerns about the content, text, or information contained within the body of this dictation should be directly addressed to the physician for clarification        This document was signed by Rich Herzog M.D. August 28, 2018 4:25 PM

## 2018-10-19 ENCOUNTER — HOSPITAL ENCOUNTER (OUTPATIENT)
Dept: MAMMOGRAPHY | Facility: HOSPITAL | Age: 53
Discharge: HOME | End: 2018-10-19
Admitting: PHYSICIAN ASSISTANT

## 2018-10-19 DIAGNOSIS — Z12.39 BREAST CANCER SCREENING: ICD-10-CM

## 2018-10-19 PROCEDURE — 77067 SCR MAMMO BI INCL CAD: CPT | Performed by: RADIOLOGY

## 2018-10-19 PROCEDURE — 77063 BREAST TOMOSYNTHESIS BI: CPT

## 2018-10-19 PROCEDURE — 77067 SCR MAMMO BI INCL CAD: CPT

## 2018-10-19 PROCEDURE — 77063 BREAST TOMOSYNTHESIS BI: CPT | Performed by: RADIOLOGY

## 2018-11-15 ENCOUNTER — APPOINTMENT (OUTPATIENT)
Dept: GENERAL RADIOLOGY | Facility: HOSPITAL | Age: 53
End: 2018-11-15

## 2018-11-15 ENCOUNTER — HOSPITAL ENCOUNTER (EMERGENCY)
Facility: HOSPITAL | Age: 53
Discharge: HOME | End: 2018-11-15
Attending: EMERGENCY MEDICINE | Admitting: EMERGENCY MEDICINE

## 2018-11-15 VITALS
DIASTOLIC BLOOD PRESSURE: 81 MMHG | HEART RATE: 82 BPM | OXYGEN SATURATION: 99 % | WEIGHT: 248 LBS | HEIGHT: 72 IN | BODY MASS INDEX: 33.59 KG/M2 | SYSTOLIC BLOOD PRESSURE: 133 MMHG | RESPIRATION RATE: 16 BRPM | TEMPERATURE: 98 F

## 2018-11-15 DIAGNOSIS — S82.839A AVULSION FRACTURE OF DISTAL END OF FIBULA: Primary | ICD-10-CM

## 2018-11-15 LAB
6-ACETYL MORPHINE: NEGATIVE
AMPHET+METHAMPHET UR QL: NEGATIVE
BACTERIA UR QL AUTO: ABNORMAL /HPF
BARBITURATES UR QL SCN: NEGATIVE
BENZODIAZ UR QL SCN: NEGATIVE
BILIRUB UR QL STRIP: NEGATIVE
BUPRENORPHINE SERPL-MCNC: NEGATIVE NG/ML
CANNABINOIDS SERPL QL: NEGATIVE
CLARITY UR: CLEAR
COCAINE UR QL: NEGATIVE
COLOR UR: YELLOW
GLUCOSE UR STRIP-MCNC: NEGATIVE MG/DL
HGB UR QL STRIP.AUTO: NEGATIVE
HYALINE CASTS UR QL AUTO: ABNORMAL /LPF
KETONES UR QL STRIP: NEGATIVE
LEUKOCYTE ESTERASE UR QL STRIP.AUTO: ABNORMAL
METHADONE UR QL SCN: NEGATIVE
NITRITE UR QL STRIP: NEGATIVE
OPIATES UR QL: NEGATIVE
OXYCODONE UR QL SCN: NEGATIVE
PCP UR QL SCN: NEGATIVE
PH UR STRIP.AUTO: 6.5 [PH] (ref 5–8)
PROT UR QL STRIP: NEGATIVE
RBC # UR: ABNORMAL /HPF
REF LAB TEST METHOD: ABNORMAL
SP GR UR STRIP: 1.02 (ref 1–1.03)
SQUAMOUS #/AREA URNS HPF: ABNORMAL /HPF
UROBILINOGEN UR QL STRIP: ABNORMAL
WBC UR QL AUTO: ABNORMAL /HPF

## 2018-11-15 PROCEDURE — 73110 X-RAY EXAM OF WRIST: CPT | Performed by: RADIOLOGY

## 2018-11-15 PROCEDURE — 73610 X-RAY EXAM OF ANKLE: CPT

## 2018-11-15 PROCEDURE — 80307 DRUG TEST PRSMV CHEM ANLYZR: CPT | Performed by: EMERGENCY MEDICINE

## 2018-11-15 PROCEDURE — 81001 URINALYSIS AUTO W/SCOPE: CPT | Performed by: EMERGENCY MEDICINE

## 2018-11-15 PROCEDURE — 73610 X-RAY EXAM OF ANKLE: CPT | Performed by: RADIOLOGY

## 2018-11-15 PROCEDURE — 73110 X-RAY EXAM OF WRIST: CPT

## 2018-11-15 PROCEDURE — 73562 X-RAY EXAM OF KNEE 3: CPT | Performed by: RADIOLOGY

## 2018-11-15 PROCEDURE — 25010000002 KETOROLAC TROMETHAMINE PER 15 MG: Performed by: PHYSICIAN ASSISTANT

## 2018-11-15 PROCEDURE — 99284 EMERGENCY DEPT VISIT MOD MDM: CPT

## 2018-11-15 PROCEDURE — 73562 X-RAY EXAM OF KNEE 3: CPT

## 2018-11-15 PROCEDURE — 96372 THER/PROPH/DIAG INJ SC/IM: CPT

## 2018-11-15 RX ORDER — KETOROLAC TROMETHAMINE 10 MG/1
10 TABLET, FILM COATED ORAL EVERY 6 HOURS PRN
Qty: 30 TABLET | Refills: 0 | Status: SHIPPED | OUTPATIENT
Start: 2018-11-15 | End: 2020-02-26 | Stop reason: ALTCHOICE

## 2018-11-15 RX ORDER — KETOROLAC TROMETHAMINE 30 MG/ML
60 INJECTION, SOLUTION INTRAMUSCULAR; INTRAVENOUS ONCE
Status: COMPLETED | OUTPATIENT
Start: 2018-11-15 | End: 2018-11-15

## 2018-11-15 RX ORDER — METHOCARBAMOL 500 MG/1
500 TABLET, FILM COATED ORAL 2 TIMES DAILY PRN
Qty: 20 TABLET | Refills: 0 | Status: SHIPPED | OUTPATIENT
Start: 2018-11-15 | End: 2020-02-26 | Stop reason: ALTCHOICE

## 2018-11-15 RX ADMIN — KETOROLAC TROMETHAMINE 60 MG: 60 INJECTION, SOLUTION INTRAMUSCULAR at 20:10

## 2018-11-16 NOTE — ED PROVIDER NOTES
Subjective     History provided by:  Patient  Fall   Mechanism of injury: fall    Injury location:  Leg and shoulder/arm  Shoulder/arm injury location:  L wrist  Leg injury location:  R ankle and L knee  Incident location: Tripped over concrete   Fall:     Entrapped after fall: no    Suspicion of alcohol use: no    Suspicion of drug use: no    Prior to arrival data:     Patient ambulatory at scene: yes      Loss of consciousness: no      Amnesic to event: no    Associated symptoms: no abdominal pain and no chest pain        Review of Systems   Constitutional: Negative.  Negative for fever.   HENT: Negative.    Respiratory: Negative.    Cardiovascular: Negative.  Negative for chest pain.   Gastrointestinal: Negative.  Negative for abdominal pain.   Endocrine: Negative.    Genitourinary: Negative.  Negative for dysuria.   Musculoskeletal: Positive for arthralgias.   Skin: Positive for wound.   Neurological: Negative.    Psychiatric/Behavioral: Negative.    All other systems reviewed and are negative.      Past Medical History:   Diagnosis Date   • Arthritis    • Diabetes mellitus (CMS/HCC)    • Osteoarthritis        Allergies   Allergen Reactions   • Sulfa Antibiotics        Past Surgical History:   Procedure Laterality Date   • CHOLECYSTECTOMY     • DILATATION AND CURETTAGE     • HYSTERECTOMY      partial 43       Family History   Problem Relation Age of Onset   • Collagen disease Paternal Grandmother    • Breast cancer Neg Hx        Social History     Socioeconomic History   • Marital status: Single     Spouse name: Not on file   • Number of children: Not on file   • Years of education: Not on file   • Highest education level: Not on file   Tobacco Use   • Smoking status: Never Smoker   • Smokeless tobacco: Never Used   Substance and Sexual Activity   • Alcohol use: No   • Sexual activity: Defer           Objective   Physical Exam   Constitutional: She is oriented to person, place, and time. She appears  well-developed and well-nourished. No distress.   HENT:   Head: Normocephalic and atraumatic.   Right Ear: External ear normal.   Left Ear: External ear normal.   Nose: Nose normal.   Eyes: Conjunctivae are normal.   Neck: Normal range of motion. Neck supple. No JVD present. No tracheal deviation present.   Cardiovascular: Normal rate.   No murmur heard.  Pulmonary/Chest: Effort normal. No respiratory distress. She has no wheezes.   Abdominal: Soft. There is no tenderness.   Musculoskeletal: She exhibits tenderness. She exhibits no edema or deformity.   Tenderness and edema of the right ankle.  Mild tenderness of the left wrist.  Inferior patella tenderness of the left knee.    Neurological: She is alert and oriented to person, place, and time. No cranial nerve deficit.   Skin: Skin is warm and dry. No rash noted. She is not diaphoretic. No erythema. No pallor.   Abrasion to anterior left knee.    Psychiatric: She has a normal mood and affect. Her behavior is normal. Thought content normal.   Nursing note and vitals reviewed.      Procedures           ED Course  ED Course as of Nov 15 2052   Thu Nov 15, 2018   2049 X-rays interpreted by Dr. Grey: Avulsion fracture of the distal fibula right.  No apparent acute bony abnormalities to the left wrist and left knee.  [RB]      ED Course User Index  [RB] Joe Vann PA                  MDM  Number of Diagnoses or Management Options  Avulsion fracture of distal end of fibula: new and requires workup     Amount and/or Complexity of Data Reviewed  Tests in the radiology section of CPT®: ordered and reviewed  Discuss the patient with other providers: yes    Risk of Complications, Morbidity, and/or Mortality  Presenting problems: low  Diagnostic procedures: low  Management options: low    Patient Progress  Patient progress: stable        Final diagnoses:   Avulsion fracture of distal end of fibula            Joe Vann PA  11/15/18 2052

## 2020-02-26 ENCOUNTER — OFFICE VISIT (OUTPATIENT)
Dept: ORTHOPEDIC SURGERY | Facility: CLINIC | Age: 55
End: 2020-02-26

## 2020-02-26 VITALS — WEIGHT: 246.91 LBS | BODY MASS INDEX: 33.44 KG/M2 | HEIGHT: 72 IN

## 2020-02-26 DIAGNOSIS — M25.562 PAIN IN BOTH KNEES, UNSPECIFIED CHRONICITY: ICD-10-CM

## 2020-02-26 DIAGNOSIS — M25.561 PAIN IN BOTH KNEES, UNSPECIFIED CHRONICITY: ICD-10-CM

## 2020-02-26 DIAGNOSIS — M17.0 PRIMARY OSTEOARTHRITIS OF BOTH KNEES: Primary | ICD-10-CM

## 2020-02-26 PROCEDURE — 20610 DRAIN/INJ JOINT/BURSA W/O US: CPT | Performed by: ORTHOPAEDIC SURGERY

## 2020-02-26 RX ORDER — ONDANSETRON 4 MG/1
4 TABLET, FILM COATED ORAL DAILY
COMMUNITY
Start: 2020-01-14

## 2020-02-26 RX ORDER — OMEPRAZOLE 40 MG/1
40 CAPSULE, DELAYED RELEASE ORAL DAILY
COMMUNITY
Start: 2020-02-22

## 2020-02-26 RX ADMIN — LIDOCAINE HYDROCHLORIDE 2 ML: 20 INJECTION, SOLUTION INFILTRATION; PERINEURAL at 19:28

## 2020-02-26 RX ADMIN — METHYLPREDNISOLONE ACETATE 80 MG: 80 INJECTION, SUSPENSION INTRA-ARTICULAR; INTRALESIONAL; INTRAMUSCULAR; SOFT TISSUE at 19:28

## 2020-02-26 NOTE — PROGRESS NOTES
Follow-up Visit         Patient: Zina Angel  YOB: 1965  Date of Encounter: 02/26/2020      Chief  Complaint:   Chief Complaint   Patient presents with   • Left Knee - Follow-up, Pain, Edema, Numbness   • Right Knee - Follow-up, Pain, Edema, Numbness           HPI:  Zina Angel, 54 y.o. female presents with complaints of bilateral knee pain was last seen and provided steroid injections August 28 of 2018 per Dr. Herzog.  She had good response approximately 1 year pain does radiate distally she denies weakness or numbness in either leg.  Moderate discomfort going up and down steps.  She has not noticed significant knee swelling.        Medical History:  Patient Active Problem List   Diagnosis   • Patellofemoral pain syndrome of both knees     Past Medical History:   Diagnosis Date   • Arthritis    • Diabetes mellitus (CMS/HCC)    • Osteoarthritis            Social History:  Social History     Socioeconomic History   • Marital status: Single     Spouse name: Not on file   • Number of children: Not on file   • Years of education: Not on file   • Highest education level: Not on file   Tobacco Use   • Smoking status: Never Smoker   • Smokeless tobacco: Never Used   Substance and Sexual Activity   • Alcohol use: No   • Sexual activity: Defer           Surgical History:  Past Surgical History:   Procedure Laterality Date   • CHOLECYSTECTOMY     • DILATATION AND CURETTAGE     • HYSTERECTOMY      partial 43           Radiology:     Previous radiographs review show mild osteoarthritis bilateral knees.      Examination:   Examination with minimal effusion to either knee moderate medial joint line tenderness mild crepitance with flexion extension patellofemoral joints bilaterally.        Assessment & Plan:   54 y.o. female presents follow-up with previous steroid injection bilateral knees about 18 months ago with good response today that is repeated she is given Depo-Medrol 80 mg intra-articular  with lidocaine block bilateral knees she will follow-up as needed.         Diagnosis Plan   1. Primary osteoarthritis of both knees  Large Joint Arthrocentesis    Large Joint Arthrocentesis   2. Pain in both knees, unspecified chronicity  Large Joint Arthrocentesis    Large Joint Arthrocentesis         Large Joint Arthrocentesis: R knee  Date/Time: 2/26/2020 7:28 PM  Consent given by: patient  Timeout: Immediately prior to procedure a time out was called to verify the correct patient, procedure, equipment, support staff and site/side marked as required   Supporting Documentation  Indications: pain   Procedure Details  Location: knee - R knee  Preparation: Patient was prepped and draped in the usual sterile fashion  Needle size: 25 G  Approach: anterolateral  Medications administered: 2 mL lidocaine 2%; 80 mg methylPREDNISolone acetate 80 MG/ML  Patient tolerance: patient tolerated the procedure well with no immediate complications    Large Joint Arthrocentesis: L knee  Date/Time: 2/26/2020 7:28 PM  Consent given by: patient  Timeout: Immediately prior to procedure a time out was called to verify the correct patient, procedure, equipment, support staff and site/side marked as required   Supporting Documentation  Indications: pain   Procedure Details  Location: knee - L knee  Preparation: Patient was prepped and draped in the usual sterile fashion  Needle size: 25 G  Approach: anterolateral  Medications administered: 2 mL lidocaine 2%; 80 mg methylPREDNISolone acetate 80 MG/ML  Patient tolerance: patient tolerated the procedure well with no immediate complications              Cc:  Jj Padilla PA              This document has been electronically signed by Landon Morales MD   February 26, 2020 7:28 PM

## 2020-02-28 RX ORDER — METHYLPREDNISOLONE ACETATE 80 MG/ML
80 INJECTION, SUSPENSION INTRA-ARTICULAR; INTRALESIONAL; INTRAMUSCULAR; SOFT TISSUE
Status: COMPLETED | OUTPATIENT
Start: 2020-02-26 | End: 2020-02-26

## 2020-02-28 RX ORDER — LIDOCAINE HYDROCHLORIDE 20 MG/ML
2 INJECTION, SOLUTION INFILTRATION; PERINEURAL
Status: COMPLETED | OUTPATIENT
Start: 2020-02-26 | End: 2020-02-26

## 2020-03-23 ENCOUNTER — LAB (OUTPATIENT)
Dept: LAB | Facility: HOSPITAL | Age: 55
End: 2020-03-23

## 2020-03-23 ENCOUNTER — TRANSCRIBE ORDERS (OUTPATIENT)
Dept: ADMINISTRATIVE | Facility: HOSPITAL | Age: 55
End: 2020-03-23

## 2020-03-23 DIAGNOSIS — R05.9 COUGH: Primary | ICD-10-CM

## 2020-03-23 DIAGNOSIS — R05.9 COUGH: ICD-10-CM

## 2020-03-23 PROCEDURE — U0003 INFECTIOUS AGENT DETECTION BY NUCLEIC ACID (DNA OR RNA); SEVERE ACUTE RESPIRATORY SYNDROME CORONAVIRUS 2 (SARS-COV-2) (CORONAVIRUS DISEASE [COVID-19]), AMPLIFIED PROBE TECHNIQUE, MAKING USE OF HIGH THROUGHPUT TECHNOLOGIES AS DESCRIBED BY CMS-2020-01-R: HCPCS

## 2020-03-23 PROCEDURE — 87635 SARS-COV-2 COVID-19 AMP PRB: CPT

## 2020-04-01 LAB — SARS-COV-2 RNA RESP QL NAA+PROBE: NOT DETECTED

## 2021-01-21 ENCOUNTER — HOSPITAL ENCOUNTER (OUTPATIENT)
Dept: MAMMOGRAPHY | Facility: HOSPITAL | Age: 56
Discharge: HOME | End: 2021-01-21
Admitting: PHYSICIAN ASSISTANT

## 2021-01-21 DIAGNOSIS — Z12.31 VISIT FOR SCREENING MAMMOGRAM: ICD-10-CM

## 2021-01-21 PROCEDURE — 77063 BREAST TOMOSYNTHESIS BI: CPT

## 2021-01-21 PROCEDURE — 77063 BREAST TOMOSYNTHESIS BI: CPT | Performed by: RADIOLOGY

## 2021-01-21 PROCEDURE — 77067 SCR MAMMO BI INCL CAD: CPT | Performed by: RADIOLOGY

## 2021-01-21 PROCEDURE — 77067 SCR MAMMO BI INCL CAD: CPT

## 2021-03-16 ENCOUNTER — BULK ORDERING (OUTPATIENT)
Dept: CASE MANAGEMENT | Facility: OTHER | Age: 56
End: 2021-03-16

## 2021-03-16 DIAGNOSIS — Z23 IMMUNIZATION DUE: ICD-10-CM

## 2023-08-08 ENCOUNTER — TRANSCRIBE ORDERS (OUTPATIENT)
Dept: ADMINISTRATIVE | Facility: HOSPITAL | Age: 58
End: 2023-08-08
Payer: COMMERCIAL

## 2023-08-08 DIAGNOSIS — Z12.31 ENCOUNTER FOR SCREENING MAMMOGRAM FOR MALIGNANT NEOPLASM OF BREAST: Primary | ICD-10-CM

## 2023-08-25 ENCOUNTER — HOSPITAL ENCOUNTER (OUTPATIENT)
Dept: MAMMOGRAPHY | Facility: HOSPITAL | Age: 58
Discharge: HOME | End: 2023-08-25
Admitting: NURSE PRACTITIONER
Payer: COMMERCIAL

## 2023-08-25 DIAGNOSIS — Z12.31 ENCOUNTER FOR SCREENING MAMMOGRAM FOR MALIGNANT NEOPLASM OF BREAST: ICD-10-CM

## 2023-08-25 PROCEDURE — 77067 SCR MAMMO BI INCL CAD: CPT

## 2023-08-25 PROCEDURE — 77063 BREAST TOMOSYNTHESIS BI: CPT

## 2024-01-09 ENCOUNTER — HOSPITAL ENCOUNTER (OUTPATIENT)
Facility: HOSPITAL | Age: 59
Discharge: HOME | End: 2024-01-09
Admitting: NURSE PRACTITIONER
Payer: COMMERCIAL

## 2024-01-09 ENCOUNTER — TRANSCRIBE ORDERS (OUTPATIENT)
Dept: ADMINISTRATIVE | Facility: HOSPITAL | Age: 59
End: 2024-01-09
Payer: COMMERCIAL

## 2024-01-09 DIAGNOSIS — M17.0 PRIMARY OSTEOARTHRITIS OF BOTH KNEES: Primary | ICD-10-CM

## 2024-01-09 DIAGNOSIS — M17.0 PRIMARY OSTEOARTHRITIS OF BOTH KNEES: ICD-10-CM

## 2024-01-09 PROCEDURE — 73562 X-RAY EXAM OF KNEE 3: CPT
